# Patient Record
Sex: MALE | Race: WHITE | Employment: OTHER | URBAN - METROPOLITAN AREA
[De-identification: names, ages, dates, MRNs, and addresses within clinical notes are randomized per-mention and may not be internally consistent; named-entity substitution may affect disease eponyms.]

---

## 2019-01-11 ENCOUNTER — HOSPITAL ENCOUNTER (OUTPATIENT)
Age: 19
Discharge: HOME | End: 2019-01-11
Attending: EMERGENCY MEDICINE
Payer: OTHER GOVERNMENT

## 2019-01-11 ENCOUNTER — APPOINTMENT (OUTPATIENT)
Dept: GENERAL RADIOLOGY | Age: 19
End: 2019-01-11
Attending: EMERGENCY MEDICINE
Payer: OTHER GOVERNMENT

## 2019-01-11 VITALS
TEMPERATURE: 97 F | HEART RATE: 71 BPM | RESPIRATION RATE: 18 BRPM | OXYGEN SATURATION: 100 % | WEIGHT: 155 LBS | DIASTOLIC BLOOD PRESSURE: 62 MMHG | SYSTOLIC BLOOD PRESSURE: 148 MMHG | HEIGHT: 64 IN | BODY MASS INDEX: 26.46 KG/M2

## 2019-01-11 DIAGNOSIS — S89.92XA INJURY OF LEFT LOWER EXTREMITY, INITIAL ENCOUNTER: Primary | ICD-10-CM

## 2019-01-11 PROCEDURE — 73610 X-RAY EXAM OF ANKLE: CPT | Performed by: EMERGENCY MEDICINE

## 2019-01-11 PROCEDURE — 99203 OFFICE O/P NEW LOW 30 MIN: CPT

## 2019-01-11 NOTE — ED INITIAL ASSESSMENT (HPI)
Left ankle pain. Running yesterday and rolled ankle. Patient fell to the ground. Patient wearing boots from [employer]. Patient ambulated to Room with slight limp.

## 2019-01-11 NOTE — ED PROVIDER NOTES
Patient Seen in: [address]    History   Stated Complaint: left ankle pain    HPI    Patient states while running yesterday he twisted his ankle and felt an abnormal sensation of popping and since then has pain to his la aspects of the left ankle post rolling injury and fall 1 day ago. TECHNIQUE: 3 views were obtained. FINDINGS:  BONES: Normal.  No significant arthropathy, fracture, or acute abnormality. SOFT TISSUES: Mild lateral soft tissue swelling.  EFFUSION: None v